# Patient Record
Sex: FEMALE | Race: OTHER | Employment: FULL TIME | URBAN - METROPOLITAN AREA
[De-identification: names, ages, dates, MRNs, and addresses within clinical notes are randomized per-mention and may not be internally consistent; named-entity substitution may affect disease eponyms.]

---

## 2018-09-25 LAB
ANTIBODY SCREEN, EXTERNAL: NEGATIVE
CHLAMYDIA, EXTERNAL: NEGATIVE
HBSAG, EXTERNAL: NEGATIVE
HIV, EXTERNAL: NORMAL
N. GONORRHEA, EXTERNAL: NEGATIVE
RPR, EXTERNAL: NEGATIVE
RUBELLA, EXTERNAL: NORMAL
TYPE, ABO & RH, EXTERNAL: NORMAL

## 2019-04-18 LAB — GRBS, EXTERNAL: NEGATIVE

## 2019-05-13 ENCOUNTER — ANESTHESIA EVENT (OUTPATIENT)
Dept: LABOR AND DELIVERY | Age: 29
End: 2019-05-13
Payer: COMMERCIAL

## 2019-05-13 ENCOUNTER — ANESTHESIA (OUTPATIENT)
Dept: LABOR AND DELIVERY | Age: 29
End: 2019-05-13
Payer: COMMERCIAL

## 2019-05-13 ENCOUNTER — HOSPITAL ENCOUNTER (INPATIENT)
Age: 29
LOS: 3 days | Discharge: HOME | End: 2019-05-16
Attending: OBSTETRICS & GYNECOLOGY | Admitting: OBSTETRICS & GYNECOLOGY
Payer: COMMERCIAL

## 2019-05-13 LAB
ALBUMIN SERPL-MCNC: 2.4 G/DL (ref 3.5–5)
ALBUMIN SERPL-MCNC: 2.4 G/DL (ref 3.5–5)
ALBUMIN/GLOB SERPL: 0.7 {RATIO} (ref 1.1–2.2)
ALBUMIN/GLOB SERPL: 0.7 {RATIO} (ref 1.1–2.2)
ALP SERPL-CCNC: 259 U/L (ref 45–117)
ALP SERPL-CCNC: 262 U/L (ref 45–117)
ALT SERPL-CCNC: 28 U/L (ref 12–78)
ALT SERPL-CCNC: 29 U/L (ref 12–78)
AMPHET UR QL SCN: NEGATIVE
ANION GAP SERPL CALC-SCNC: 5 MMOL/L (ref 5–15)
APPEARANCE UR: CLEAR
AST SERPL-CCNC: 22 U/L (ref 15–37)
AST SERPL-CCNC: 24 U/L (ref 15–37)
BACTERIA URNS QL MICRO: NEGATIVE /HPF
BARBITURATES UR QL SCN: NEGATIVE
BASOPHILS # BLD: 0 K/UL (ref 0–0.1)
BASOPHILS NFR BLD: 0 % (ref 0–1)
BENZODIAZ UR QL: NEGATIVE
BILIRUB DIRECT SERPL-MCNC: <0.1 MG/DL (ref 0–0.2)
BILIRUB SERPL-MCNC: 0.1 MG/DL (ref 0.2–1)
BILIRUB SERPL-MCNC: <0.1 MG/DL (ref 0.2–1)
BILIRUB UR QL: NEGATIVE
BUN SERPL-MCNC: 12 MG/DL (ref 6–20)
BUN/CREAT SERPL: 24 (ref 12–20)
CALCIUM SERPL-MCNC: 8.6 MG/DL (ref 8.5–10.1)
CANNABINOIDS UR QL SCN: NEGATIVE
CHLORIDE SERPL-SCNC: 111 MMOL/L (ref 97–108)
CO2 SERPL-SCNC: 21 MMOL/L (ref 21–32)
COCAINE UR QL SCN: NEGATIVE
COLOR UR: ABNORMAL
CREAT SERPL-MCNC: 0.5 MG/DL (ref 0.55–1.02)
CREAT UR-MCNC: 45.7 MG/DL
DIFFERENTIAL METHOD BLD: ABNORMAL
DRUG SCRN COMMENT,DRGCM: NORMAL
EOSINOPHIL # BLD: 0.1 K/UL (ref 0–0.4)
EOSINOPHIL NFR BLD: 1 % (ref 0–7)
EPITH CASTS URNS QL MICRO: ABNORMAL /LPF
ERYTHROCYTE [DISTWIDTH] IN BLOOD BY AUTOMATED COUNT: 14.9 % (ref 11.5–14.5)
GLOBULIN SER CALC-MCNC: 3.3 G/DL (ref 2–4)
GLOBULIN SER CALC-MCNC: 3.4 G/DL (ref 2–4)
GLUCOSE SERPL-MCNC: 68 MG/DL (ref 65–100)
GLUCOSE UR STRIP.AUTO-MCNC: NEGATIVE MG/DL
HCT VFR BLD AUTO: 36.2 % (ref 35–47)
HGB BLD-MCNC: 11.9 G/DL (ref 11.5–16)
HGB UR QL STRIP: NEGATIVE
HYALINE CASTS URNS QL MICRO: ABNORMAL /LPF (ref 0–5)
IMM GRANULOCYTES # BLD AUTO: 0 K/UL (ref 0–0.04)
IMM GRANULOCYTES NFR BLD AUTO: 1 % (ref 0–0.5)
KETONES UR QL STRIP.AUTO: NEGATIVE MG/DL
LDH SERPL L TO P-CCNC: 196 U/L (ref 81–246)
LEUKOCYTE ESTERASE UR QL STRIP.AUTO: ABNORMAL
LYMPHOCYTES # BLD: 2.6 K/UL (ref 0.8–3.5)
LYMPHOCYTES NFR BLD: 30 % (ref 12–49)
MAGNESIUM SERPL-MCNC: 2 MG/DL (ref 1.6–2.4)
MCH RBC QN AUTO: 29.1 PG (ref 26–34)
MCHC RBC AUTO-ENTMCNC: 32.9 G/DL (ref 30–36.5)
MCV RBC AUTO: 88.5 FL (ref 80–99)
METHADONE UR QL: NEGATIVE
MONOCYTES # BLD: 0.6 K/UL (ref 0–1)
MONOCYTES NFR BLD: 7 % (ref 5–13)
NEUTS SEG # BLD: 5.1 K/UL (ref 1.8–8)
NEUTS SEG NFR BLD: 61 % (ref 32–75)
NITRITE UR QL STRIP.AUTO: NEGATIVE
NRBC # BLD: 0 K/UL (ref 0–0.01)
NRBC BLD-RTO: 0 PER 100 WBC
OPIATES UR QL: NEGATIVE
PCP UR QL: NEGATIVE
PH UR STRIP: 7 [PH] (ref 5–8)
PLATELET # BLD AUTO: 174 K/UL (ref 150–400)
PMV BLD AUTO: 11.5 FL (ref 8.9–12.9)
POTASSIUM SERPL-SCNC: 4.2 MMOL/L (ref 3.5–5.1)
PROT SERPL-MCNC: 5.7 G/DL (ref 6.4–8.2)
PROT SERPL-MCNC: 5.8 G/DL (ref 6.4–8.2)
PROT UR STRIP-MCNC: 100 MG/DL
PROT UR-MCNC: 172 MG/DL (ref 0–11.9)
PROT/CREAT UR-RTO: 3.8
RBC # BLD AUTO: 4.09 M/UL (ref 3.8–5.2)
RBC #/AREA URNS HPF: ABNORMAL /HPF (ref 0–5)
SODIUM SERPL-SCNC: 137 MMOL/L (ref 136–145)
SP GR UR REFRACTOMETRY: 1.01 (ref 1–1.03)
URATE SERPL-MCNC: 4.1 MG/DL (ref 2.6–6)
UROBILINOGEN UR QL STRIP.AUTO: 0.2 EU/DL (ref 0.2–1)
WBC # BLD AUTO: 8.5 K/UL (ref 3.6–11)
WBC URNS QL MICRO: ABNORMAL /HPF (ref 0–4)

## 2019-05-13 PROCEDURE — 84156 ASSAY OF PROTEIN URINE: CPT

## 2019-05-13 PROCEDURE — 80307 DRUG TEST PRSMV CHEM ANLYZR: CPT

## 2019-05-13 PROCEDURE — 81001 URINALYSIS AUTO W/SCOPE: CPT

## 2019-05-13 PROCEDURE — 77010026065 HC OXYGEN MINIMUM MEDICAL AIR: Performed by: OBSTETRICS & GYNECOLOGY

## 2019-05-13 PROCEDURE — 80053 COMPREHEN METABOLIC PANEL: CPT

## 2019-05-13 PROCEDURE — 85025 COMPLETE CBC W/AUTO DIFF WBC: CPT

## 2019-05-13 PROCEDURE — 76060000078 HC EPIDURAL ANESTHESIA: Performed by: NURSE ANESTHETIST, CERTIFIED REGISTERED

## 2019-05-13 PROCEDURE — 36415 COLL VENOUS BLD VENIPUNCTURE: CPT

## 2019-05-13 PROCEDURE — 80076 HEPATIC FUNCTION PANEL: CPT

## 2019-05-13 PROCEDURE — 83735 ASSAY OF MAGNESIUM: CPT

## 2019-05-13 PROCEDURE — 75410000002 HC LABOR FEE PER 1 HR: Performed by: OBSTETRICS & GYNECOLOGY

## 2019-05-13 PROCEDURE — 59200 INSERT CERVICAL DILATOR: CPT | Performed by: OBSTETRICS & GYNECOLOGY

## 2019-05-13 PROCEDURE — 3E033VJ INTRODUCTION OF OTHER HORMONE INTO PERIPHERAL VEIN, PERCUTANEOUS APPROACH: ICD-10-PCS | Performed by: OBSTETRICS & GYNECOLOGY

## 2019-05-13 PROCEDURE — 84550 ASSAY OF BLOOD/URIC ACID: CPT

## 2019-05-13 PROCEDURE — 83615 LACTATE (LD) (LDH) ENZYME: CPT

## 2019-05-13 PROCEDURE — 65270000029 HC RM PRIVATE

## 2019-05-13 PROCEDURE — 74011250637 HC RX REV CODE- 250/637: Performed by: OBSTETRICS & GYNECOLOGY

## 2019-05-13 PROCEDURE — 75410000003 HC RECOV DEL/VAG/CSECN EA 0.5 HR: Performed by: OBSTETRICS & GYNECOLOGY

## 2019-05-13 PROCEDURE — 74011250636 HC RX REV CODE- 250/636: Performed by: OBSTETRICS & GYNECOLOGY

## 2019-05-13 PROCEDURE — 75410000000 HC DELIVERY VAGINAL/SINGLE: Performed by: OBSTETRICS & GYNECOLOGY

## 2019-05-13 RX ORDER — SODIUM CHLORIDE, SODIUM LACTATE, POTASSIUM CHLORIDE, CALCIUM CHLORIDE 600; 310; 30; 20 MG/100ML; MG/100ML; MG/100ML; MG/100ML
125 INJECTION, SOLUTION INTRAVENOUS CONTINUOUS
Status: DISCONTINUED | OUTPATIENT
Start: 2019-05-13 | End: 2019-05-14 | Stop reason: HOSPADM

## 2019-05-13 RX ORDER — ONDANSETRON 2 MG/ML
4 INJECTION INTRAMUSCULAR; INTRAVENOUS
Status: DISCONTINUED | OUTPATIENT
Start: 2019-05-13 | End: 2019-05-16 | Stop reason: HOSPADM

## 2019-05-13 RX ORDER — NALBUPHINE HYDROCHLORIDE 10 MG/ML
10 INJECTION, SOLUTION INTRAMUSCULAR; INTRAVENOUS; SUBCUTANEOUS
Status: DISCONTINUED | OUTPATIENT
Start: 2019-05-13 | End: 2019-05-15

## 2019-05-13 RX ORDER — SODIUM CHLORIDE 0.9 % (FLUSH) 0.9 %
5-40 SYRINGE (ML) INJECTION AS NEEDED
Status: DISCONTINUED | OUTPATIENT
Start: 2019-05-13 | End: 2019-05-15

## 2019-05-13 RX ORDER — FAMOTIDINE 20 MG/1
20 TABLET, FILM COATED ORAL 2 TIMES DAILY
Status: DISCONTINUED | OUTPATIENT
Start: 2019-05-13 | End: 2019-05-13

## 2019-05-13 RX ORDER — EPHEDRINE SULFATE/0.9% NACL/PF 50 MG/5 ML
10 SYRINGE (ML) INTRAVENOUS
Status: ACTIVE | OUTPATIENT
Start: 2019-05-13 | End: 2019-05-14

## 2019-05-13 RX ORDER — FENTANYL/BUPIVACAINE/NS/PF 2-1250MCG
10 PREFILLED PUMP RESERVOIR EPIDURAL CONTINUOUS
Status: DISCONTINUED | OUTPATIENT
Start: 2019-05-13 | End: 2019-05-14 | Stop reason: HOSPADM

## 2019-05-13 RX ORDER — OXYTOCIN/0.9 % SODIUM CHLORIDE 30/500 ML
0-25 PLASTIC BAG, INJECTION (ML) INTRAVENOUS
Status: DISCONTINUED | OUTPATIENT
Start: 2019-05-13 | End: 2019-05-15

## 2019-05-13 RX ORDER — FAMOTIDINE 20 MG/1
20 TABLET, FILM COATED ORAL 2 TIMES DAILY
Status: DISCONTINUED | OUTPATIENT
Start: 2019-05-13 | End: 2019-05-16 | Stop reason: HOSPADM

## 2019-05-13 RX ORDER — CETIRIZINE HCL 10 MG
10 TABLET ORAL
COMMUNITY

## 2019-05-13 RX ORDER — SODIUM CHLORIDE 0.9 % (FLUSH) 0.9 %
5-40 SYRINGE (ML) INJECTION EVERY 8 HOURS
Status: DISCONTINUED | OUTPATIENT
Start: 2019-05-13 | End: 2019-05-15

## 2019-05-13 RX ADMIN — OXYTOCIN-SODIUM CHLORIDE 0.9% IV SOLN 30 UNIT/500ML 2 MILLI-UNITS/MIN: 30-0.9/5 SOLUTION at 18:39

## 2019-05-13 RX ADMIN — FAMOTIDINE 20 MG: 20 TABLET ORAL at 20:42

## 2019-05-13 NOTE — ROUTINE PROCESS
1855: Bedside and Verbal shift change report given to UNA Kenny RN (oncoming nurse) by CLIFF Burk RN (offgoing nurse). Report included the following information SBAR, Kardex, Procedure Summary, Intake/Output, MAR, Accordion and Recent Results 1911: At pt bedside for assessment. This RN discussing pain management education regarding epidural, if she decides she wants it at a later time. Pt verbalized understanding and states she is comfortable and is without any complaints at this time. 2020: Pt states she is feeling gas and signs of indigestion. This RN calling Dr. Dyan Arvizu for medication orders. MD MASTERS Pepcid 20mg PO. No further orders at this time. 2235: Pt repositioning to the birthing ball at this time. 2330: Pt repositioned to standing at the side of bed, leaning over peanut ball. Pt coping and breathing through contractions at this time. 0863: Pt ambulating in hallway using the GE monitor at this time. 0125: Pt states she would like to try IV medication for pain management so she can rest. Pt without any other complaints. 0345: Pt stating the IV pain medication has worn off and contraction pain is intensifying. Pt still denies epidural.  Pt ambulating to the bathroom at this time. 8773: Pt ambulating to bathroom. Pt repositioned on birthing ball, in moderate pain but able to breath and vocalize through each contraction. 7723: This RN educating patient again on option of receiving an epidural. Pt verbalized understanding and denies request at this time. Pt repositioned to birthing ball and continuing to cope and breathe through contractions. 0193: This RN on phone with Dr. Dyan Arvizu giving her an update on pt status. Discussing that Da Silva bulb is still in place, pitocin is at 10, ctx every 2-3 minutes and pt denies epidural.  Md states that da silva bulb can is OK to stay in place, as pt is still within 24 hours of placement.   Per MD, reapply traction to catheter. No further orders at this time. 6408: At pt bedside helping pt get back into bed after ambulating to bathroom. This RN reapplied traction to Emerson bulb and pt is back in bed at this time. 0700: Bedside and Verbal shift change report given to TEAGAN Rivera RN (oncoming nurse) by Gab Benedict RN (offgoing nurse). Report included the following information SBAR, Kardex, Procedure Summary, Intake/Output, MAR, Accordion and Recent Results.

## 2019-05-13 NOTE — PROGRESS NOTES
1500 - Report from Santy Roger RN. Assumed care. Orders previously received from Dr. Paolo Alvares. Pt is a primip induction for mild Pre E.  
 
New orders from Dr. Rio Dong to include hourly BPs and patient may discontinue fetal monitoring if tracing reactive and minimal uterine activity. Pt may eat lunch and to start pitocin induction at 1800.  
1505 - Pt off monitors to eat per MD order after Category 1 tracing and occ mild ctx. Pt instructed to notify RN if contractions increased in intensity or frequency. 1543 - Rounding to determine patient needs. No voiced needs. +FM. 1700 - Rounding to determine patient needs. No voiced needs. Pt oriented to room and what to expect for plan of care. Time allowed for questions. 1800 - Pt back on monitor. 1810 - FHR 120s with mod variability. [place] to bedside to elicit accels. 1830 - Category 1 tracing without accels. MD evaluated fetal heart rate tracing and aware. To start pitocin.

## 2019-05-13 NOTE — PROGRESS NOTES
1248: Dr. Mo Yi at bedside for assessment. Pt reports slight headache but declines tylenol at this time. 1300: SVE per Dr. Mo Yi 1.5/50/posterior,  
 
: Clance Bevels da silva cervical catheter placed by Dr. Mo Yi and inflated with 60 cc of normal saline in the uterus and 30 cc of normal saline in the vagina; pt tolerated well. MD stated for patient to be on EFM for an hour and then may be taken off. MD ordering for BPs to be hourly. MD also ordering for pitocin to be started at 1800 tonight. 1318: Primary RN CLIFF Adhikari updated.

## 2019-05-13 NOTE — PROGRESS NOTES
1125 - Pt admitted to labor and delivery for induction for pre-e. MD aware of patient and orders previously given. Consents signed. EFM x 2 applied. Pt reports good FM; denies lof and bleeding. 1140 - SLIV placed at this time. Labs also drawn and urine collected per orders; sent to lab for processing. 1146 - VS taken. Admission assessment done. Pt resting comfortably in bed, denies needs.

## 2019-05-13 NOTE — H&P
History & Physical 
 
Name: Carly Simental MRN: [id number]  SSN: [id number] YOB: 1990  Age: 34 y.o. Sex: female Subjective:  
 
Estimated Date of Delivery: 19 OB History  Para Term  AB Living 1 SAB TAB Ectopic Molar Multiple Live Births # Outcome Date GA Lbr Benjamin/2nd Weight Sex Delivery Anes PTL Lv  
1 Current Ms. Sam Girard is a 33 yo   admitted with pregnancy at [address] [place] [address] 19 for induction of labor due to preeclampsia. Had an elevated blood pressure in the office on Friday 5/10/2019  For which preeclampsia lab work was collected. The patient followed up in the office today and her BP was 148/102. Her previous lab work was significant for a urine protein to creatinine ratio of 1.6. She complains of a dull headache that she rates 3/10 and brief episode of nausea that has resolved, and lower extremity edema x 3 wks. She declines Tylenol for the HA and denies vision changes, vomiting, ruq pain, epigastric pain, contractions, leakage of vaginal fluid, vaginal bleeding, and endorses fetal movement. Prenatal course complicated by preeclampsia, uterine myomas with largest measuring at 5 cm at 36 wks, simple ovarian cyst of the on the right ovary measuring 5 x 2.6 cm at 36 wks, left bartholin cyst, and UTI. Please see prenatal records for details. Prenatal Labs:  
O pos Antibody neg Rubella immune HIV neg Hep B neg GC neg Chlamydia neg VDRL neg GBS neg Past Medical History:  
Diagnosis Date  Gestational hypertension  Kidney disease   
 reflux as a baby- self corrected. Frequent UTIs as an adult.  Psychiatric problem Anxiety and Depression History reviewed. No pertinent surgical history. Social History Occupational History  Not on file Tobacco Use  Smoking status: Never Smoker  Smokeless tobacco: Never Used Substance and Sexual Activity  Alcohol use: Not Currently  Drug use: Never  Sexual activity: Yes  
  Partners: Male Birth control/protection: None  
h/o marijuana and ecstasy use 2 yrs ago, no IVDA, tried tobacco years ago, h/o social ETOH use outside of pregnancy Family Hx: mother- HTN, DM 
MGM- DM GynHx: denies STIs, denies HSV Allergies Allergen Reactions  Clindamycin Rash  Mold Sneezing  
  dust  
 
Prior to Admission medications Medication Sig Start Date End Date Taking? Authorizing Provider  
[id number] tracey-folic-dha (PRENATAL DHA+COMPLETE PRENATAL) [id number] mg-mcg-mg cmpk Take  by mouth. Yes Provider, Historical  
cetirizine (ZYRTEC) 10 mg tablet Take 10 mg by mouth. Yes Provider, Historical  
Vitamin D po daily Review of Systems: A comprehensive review of systems was negative except for that written in the History of Present Illness. Objective:  
 
Vitals: 
Vitals:  
 05/13/19 1210 05/13/19 1215 05/13/19 1218 05/13/19 1220 BP:   142/87 Pulse:   89 Resp:      
Temp:      
SpO2: 98% 97%  97% Physical Exam: 
Patient without distress. Heart: Regular rate and rhythm or S1S2 present Lung: clear to auscultation throughout lung fields, no wheezes, no rales, no rhonchi and normal respiratory effort Abdomen: soft, nontender, gravid, EFW 7 pounds 8 ounces Fundus: soft and non tender Perineum: blood absent, amniotic fluid absent Cervical Exam: 1-2/40/-3 vtx posterior SSE: cervix prepped with betadine, cook catheter inserted with 60 ml in the uterine balloon and 30 ml in the vaginal balloon Pelvimetry- sidewalls adequate, AP good, suprapubic arch good Lower Extremities:  - Edema 2+ pitting, DTR 2+, no clonus Membranes:  Intact Fetal Heart Rate: Baseline: 135 per minute Variability: moderate Accelerations: yes Decelerations: none Uterine contractions: infrequent and irregular Recent Results (from the past 12 hour(s)) PROTEIN/CREATININE RATIO, URINE  Collection Time: 05/13/19 11:41 AM  
 Result Value Ref Range Protein, urine random 172 (H) 0.0 - 11.9 mg/dL Creatinine, urine 45.70 mg/dL Protein/Creat. urine Ratio 3.8    
CBC WITH AUTOMATED DIFF Collection Time: 05/13/19 11:41 AM  
Result Value Ref Range WBC 8.5 3.6 - 11.0 K/uL  
 RBC 4.09 3.80 - 5.20 M/uL  
 HGB 11.9 11.5 - 16.0 g/dL HCT 36.2 35.0 - 47.0 % MCV 88.5 80.0 - 99.0 FL  
 MCH 29.1 26.0 - 34.0 PG  
 MCHC 32.9 30.0 - 36.5 g/dL  
 RDW 14.9 (H) 11.5 - 14.5 % PLATELET 822 722 - 789 K/uL MPV 11.5 8.9 - 12.9 FL  
 NRBC 0.0 0  WBC ABSOLUTE NRBC 0.00 0.00 - 0.01 K/uL NEUTROPHILS 61 32 - 75 % LYMPHOCYTES 30 12 - 49 % MONOCYTES 7 5 - 13 % EOSINOPHILS 1 0 - 7 % BASOPHILS 0 0 - 1 % IMMATURE GRANULOCYTES 1 (H) 0.0 - 0.5 % ABS. NEUTROPHILS 5.1 1.8 - 8.0 K/UL  
 ABS. LYMPHOCYTES 2.6 0.8 - 3.5 K/UL  
 ABS. MONOCYTES 0.6 0.0 - 1.0 K/UL  
 ABS. EOSINOPHILS 0.1 0.0 - 0.4 K/UL  
 ABS. BASOPHILS 0.0 0.0 - 0.1 K/UL  
 ABS. IMM. GRANS. 0.0 0.00 - 0.04 K/UL  
 DF AUTOMATED METABOLIC PANEL, COMPREHENSIVE Collection Time: 05/13/19 11:41 AM  
Result Value Ref Range Sodium 137 136 - 145 mmol/L Potassium 4.2 3.5 - 5.1 mmol/L Chloride 111 (H) 97 - 108 mmol/L  
 CO2 21 21 - 32 mmol/L Anion gap 5 5 - 15 mmol/L Glucose 68 65 - 100 mg/dL BUN 12 6 - 20 MG/DL Creatinine 0.50 (L) 0.55 - 1.02 MG/DL  
 BUN/Creatinine ratio 24 (H) 12 - 20 GFR est AA >60 >60 ml/min/1.73m2 GFR est non-AA >60 >60 ml/min/1.73m2 Calcium 8.6 8.5 - 10.1 MG/DL Bilirubin, total <0.1 (L) 0.2 - 1.0 MG/DL  
 ALT (SGPT) 29 12 - 78 U/L  
 AST (SGOT) 22 15 - 37 U/L Alk. phosphatase 262 (H) 45 - 117 U/L Protein, total 5.7 (L) 6.4 - 8.2 g/dL Albumin 2.4 (L) 3.5 - 5.0 g/dL Globulin 3.3 2.0 - 4.0 g/dL A-G Ratio 0.7 (L) 1.1 - 2.2 HEPATIC FUNCTION PANEL Collection Time: 05/13/19 11:41 AM  
Result Value Ref Range Protein, total 5.8 (L) 6.4 - 8.2 g/dL Albumin 2.4 (L) 3.5 - 5.0 g/dL Globulin 3.4 2.0 - 4.0 g/dL A-G Ratio 0.7 (L) 1.1 - 2.2 Bilirubin, total 0.1 (L) 0.2 - 1.0 MG/DL Bilirubin, direct <0.1 0.0 - 0.2 MG/DL Alk. phosphatase 259 (H) 45 - 117 U/L  
 AST (SGOT) 24 15 - 37 U/L  
 ALT (SGPT) 28 12 - 78 U/L  
LD Collection Time: 19 11:41 AM  
Result Value Ref Range  81 - 246 U/L  
MAGNESIUM Collection Time: 19 11:41 AM  
Result Value Ref Range Magnesium 2.0 1.6 - 2.4 mg/dL URIC ACID Collection Time: 19 11:41 AM  
Result Value Ref Range Uric acid 4.1 2.6 - 6.0 MG/DL URINALYSIS W/MICROSCOPIC Collection Time: 19 11:42 AM  
Result Value Ref Range Color YELLOW/STRAW Appearance CLEAR CLEAR Specific gravity 1.012 1.003 - 1.030    
 pH (UA) 7.0 5.0 - 8.0 Protein 100 (A) NEG mg/dL Glucose NEGATIVE  NEG mg/dL Ketone NEGATIVE  NEG mg/dL Bilirubin NEGATIVE  NEG Blood NEGATIVE  NEG Urobilinogen 0.2 0.2 - 1.0 EU/dL Nitrites NEGATIVE  NEG Leukocyte Esterase MODERATE (A) NEG    
 WBC 20-50 0 - 4 /hpf  
 RBC 0-5 0 - 5 /hpf Epithelial cells FEW FEW /lpf Bacteria NEGATIVE  NEG /hpf Hyaline cast 0-2 0 - 5 /lpf Impression/Plan:  
  
Ass:  at 39 4/7 wks induction of labor for preeclampsia, h/o marijuana and ecstasy use, GBS negative, cat 1 fetal tracing Plan: Reji Mole catheter placed without complications. Prior to placement indications and risks of catheter insertion were d/w the patient. Risks discussed included accidental amniotomy, bleeding from cervical vessels, and/or cervical laceration. The patient voiced understanding and agreed to catheter placement. Pitocin to be initiated at 1800 UDS

## 2019-05-14 PROCEDURE — 77030014125 HC TY EPDRL BBMI -B: Performed by: ANESTHESIOLOGY

## 2019-05-14 PROCEDURE — 65270000029 HC RM PRIVATE

## 2019-05-14 PROCEDURE — 74011250637 HC RX REV CODE- 250/637: Performed by: OBSTETRICS & GYNECOLOGY

## 2019-05-14 PROCEDURE — 10907ZC DRAINAGE OF AMNIOTIC FLUID, THERAPEUTIC FROM PRODUCTS OF CONCEPTION, VIA NATURAL OR ARTIFICIAL OPENING: ICD-10-PCS | Performed by: OBSTETRICS & GYNECOLOGY

## 2019-05-14 PROCEDURE — 77030021125

## 2019-05-14 PROCEDURE — 74011000250 HC RX REV CODE- 250

## 2019-05-14 PROCEDURE — 77030011943

## 2019-05-14 PROCEDURE — 74011250637 HC RX REV CODE- 250/637

## 2019-05-14 PROCEDURE — 3E0R3BZ INTRODUCTION OF ANESTHETIC AGENT INTO SPINAL CANAL, PERCUTANEOUS APPROACH: ICD-10-PCS | Performed by: ANESTHESIOLOGY

## 2019-05-14 PROCEDURE — 74011250636 HC RX REV CODE- 250/636: Performed by: OBSTETRICS & GYNECOLOGY

## 2019-05-14 PROCEDURE — 00HU33Z INSERTION OF INFUSION DEVICE INTO SPINAL CANAL, PERCUTANEOUS APPROACH: ICD-10-PCS | Performed by: ANESTHESIOLOGY

## 2019-05-14 PROCEDURE — 74011000250 HC RX REV CODE- 250: Performed by: NURSE ANESTHETIST, CERTIFIED REGISTERED

## 2019-05-14 PROCEDURE — 77030034850

## 2019-05-14 PROCEDURE — 74011250636 HC RX REV CODE- 250/636

## 2019-05-14 PROCEDURE — 74011000258 HC RX REV CODE- 258: Performed by: OBSTETRICS & GYNECOLOGY

## 2019-05-14 RX ORDER — DOCUSATE SODIUM 100 MG/1
100 CAPSULE, LIQUID FILLED ORAL DAILY
Status: DISCONTINUED | OUTPATIENT
Start: 2019-05-15 | End: 2019-05-16 | Stop reason: HOSPADM

## 2019-05-14 RX ORDER — OXYCODONE AND ACETAMINOPHEN 5; 325 MG/1; MG/1
1 TABLET ORAL
Status: DISCONTINUED | OUTPATIENT
Start: 2019-05-14 | End: 2019-05-16 | Stop reason: HOSPADM

## 2019-05-14 RX ORDER — METHYLERGONOVINE MALEATE 0.2 MG/ML
0.2 INJECTION INTRAVENOUS ONCE
Status: COMPLETED | OUTPATIENT
Start: 2019-05-14 | End: 2019-05-14

## 2019-05-14 RX ORDER — IBUPROFEN 800 MG/1
800 TABLET ORAL EVERY 8 HOURS
Status: DISCONTINUED | OUTPATIENT
Start: 2019-05-14 | End: 2019-05-16 | Stop reason: HOSPADM

## 2019-05-14 RX ORDER — LIDOCAINE HYDROCHLORIDE 10 MG/ML
INJECTION INFILTRATION; PERINEURAL
Status: DISPENSED
Start: 2019-05-14 | End: 2019-05-15

## 2019-05-14 RX ORDER — FENTANYL CITRATE 50 UG/ML
INJECTION, SOLUTION INTRAMUSCULAR; INTRAVENOUS
Status: COMPLETED
Start: 2019-05-14 | End: 2019-05-14

## 2019-05-14 RX ORDER — MINERAL OIL
OIL (ML) ORAL
Status: COMPLETED
Start: 2019-05-14 | End: 2019-05-14

## 2019-05-14 RX ORDER — BUPIVACAINE HYDROCHLORIDE 2.5 MG/ML
INJECTION, SOLUTION EPIDURAL; INFILTRATION; INTRACAUDAL AS NEEDED
Status: DISCONTINUED | OUTPATIENT
Start: 2019-05-14 | End: 2019-05-14 | Stop reason: HOSPADM

## 2019-05-14 RX ORDER — HYDROCORTISONE ACETATE PRAMOXINE HCL 2.5; 1 G/100G; G/100G
CREAM TOPICAL AS NEEDED
Status: DISCONTINUED | OUTPATIENT
Start: 2019-05-14 | End: 2019-05-16 | Stop reason: HOSPADM

## 2019-05-14 RX ORDER — OXYTOCIN/RINGER'S LACTATE 20/1000 ML
125-500 PLASTIC BAG, INJECTION (ML) INTRAVENOUS ONCE
Status: ACTIVE | OUTPATIENT
Start: 2019-05-14 | End: 2019-05-15

## 2019-05-14 RX ORDER — MINERAL OIL
OIL (ML) ORAL ONCE
Status: ACTIVE | OUTPATIENT
Start: 2019-05-14 | End: 2019-05-15

## 2019-05-14 RX ORDER — LIDOCAINE HYDROCHLORIDE AND EPINEPHRINE 15; 5 MG/ML; UG/ML
INJECTION, SOLUTION EPIDURAL
Status: SHIPPED | OUTPATIENT
Start: 2019-05-14 | End: 2019-05-14

## 2019-05-14 RX ORDER — ACETAMINOPHEN 325 MG/1
650 TABLET ORAL
Status: DISCONTINUED | OUTPATIENT
Start: 2019-05-14 | End: 2019-05-16 | Stop reason: HOSPADM

## 2019-05-14 RX ORDER — FENTANYL CITRATE 50 UG/ML
50 INJECTION, SOLUTION INTRAMUSCULAR; INTRAVENOUS ONCE
Status: COMPLETED | OUTPATIENT
Start: 2019-05-14 | End: 2019-05-14

## 2019-05-14 RX ORDER — ZOLPIDEM TARTRATE 5 MG/1
5 TABLET ORAL
Status: DISCONTINUED | OUTPATIENT
Start: 2019-05-14 | End: 2019-05-16 | Stop reason: HOSPADM

## 2019-05-14 RX ORDER — NALOXONE HYDROCHLORIDE 0.4 MG/ML
0.4 INJECTION, SOLUTION INTRAMUSCULAR; INTRAVENOUS; SUBCUTANEOUS AS NEEDED
Status: DISCONTINUED | OUTPATIENT
Start: 2019-05-14 | End: 2019-05-16 | Stop reason: HOSPADM

## 2019-05-14 RX ADMIN — MINERAL OIL 30 ML: 99.9 LIQUID ORAL; TOPICAL at 16:34

## 2019-05-14 RX ADMIN — SODIUM CHLORIDE, SODIUM LACTATE, POTASSIUM CHLORIDE, AND CALCIUM CHLORIDE 125 ML/HR: 600; 310; 30; 20 INJECTION, SOLUTION INTRAVENOUS at 01:22

## 2019-05-14 RX ADMIN — LIDOCAINE HYDROCHLORIDE AND EPINEPHRINE 3 ML: 15; 5 INJECTION, SOLUTION EPIDURAL at 09:44

## 2019-05-14 RX ADMIN — Medication 10 ML/HR: at 10:01

## 2019-05-14 RX ADMIN — BUPIVACAINE HYDROCHLORIDE 1 ML: 2.5 INJECTION, SOLUTION EPIDURAL; INFILTRATION; INTRACAUDAL at 09:41

## 2019-05-14 RX ADMIN — SODIUM CHLORIDE, SODIUM LACTATE, POTASSIUM CHLORIDE, AND CALCIUM CHLORIDE 999 ML/HR: 600; 310; 30; 20 INJECTION, SOLUTION INTRAVENOUS at 08:33

## 2019-05-14 RX ADMIN — SODIUM CHLORIDE, SODIUM LACTATE, POTASSIUM CHLORIDE, AND CALCIUM CHLORIDE 125 ML/HR: 600; 310; 30; 20 INJECTION, SOLUTION INTRAVENOUS at 09:50

## 2019-05-14 RX ADMIN — NALBUPHINE HYDROCHLORIDE 10 MG: 10 INJECTION, SOLUTION INTRAMUSCULAR; INTRAVENOUS; SUBCUTANEOUS at 02:08

## 2019-05-14 RX ADMIN — FENTANYL CITRATE 50 MCG: 50 INJECTION, SOLUTION INTRAMUSCULAR; INTRAVENOUS at 17:31

## 2019-05-14 RX ADMIN — FAMOTIDINE 20 MG: 20 TABLET ORAL at 19:20

## 2019-05-14 RX ADMIN — ONDANSETRON 4 MG: 2 INJECTION INTRAMUSCULAR; INTRAVENOUS at 05:39

## 2019-05-14 RX ADMIN — BUPIVACAINE HYDROCHLORIDE 4 ML: 2.5 INJECTION, SOLUTION EPIDURAL; INFILTRATION; INTRACAUDAL at 11:37

## 2019-05-14 RX ADMIN — IBUPROFEN 800 MG: 800 TABLET ORAL at 19:20

## 2019-05-14 RX ADMIN — METHYLERGONOVINE MALEATE 0.2 MG: 0.2 INJECTION, SOLUTION INTRAMUSCULAR; INTRAVENOUS at 16:22

## 2019-05-14 RX ADMIN — BUPIVACAINE HYDROCHLORIDE 3 ML: 2.5 INJECTION, SOLUTION EPIDURAL; INFILTRATION; INTRACAUDAL at 09:42

## 2019-05-14 RX ADMIN — PROMETHAZINE HYDROCHLORIDE 12.5 MG: 25 INJECTION INTRAMUSCULAR; INTRAVENOUS at 02:06

## 2019-05-14 RX ADMIN — BUPIVACAINE HYDROCHLORIDE 6 ML: 2.5 INJECTION, SOLUTION EPIDURAL; INFILTRATION; INTRACAUDAL at 11:35

## 2019-05-14 NOTE — PROGRESS NOTES
Patient feeling more comfortable now with second epidural placement.  
  
Visit Vitals /79 Pulse 75 Temp 98.1 °F (36.7 °C) Resp 16 Wt 86.2 kg (190 lb) SpO2 94%  
  
SVE: 6/70/-1, AROM  
  
NST: 120s/mod /pos accels/ no decels Contractions every 2-3 min  
  
35 yo G1 at 39.5 admitted for IOL for pre-eclampsia. Pitocin decreased during epidural placement, cont to titrate now BP predominately  normotensive to mild range, isolated severe around time of epidural replacement, no meds needed, continue to monitor Repeat cervical exam in 2- 4 hrs or sooner if indicated  
Cris Sun M.D

## 2019-05-14 NOTE — L&D DELIVERY NOTE
Delivery Summary    Patient: Tali Glover MRN: [id number]  SSN: [id number]    YOB: 1990  Age: 34 y.o. Sex: female        Patient progressed to C/C/+1. Pushed for approximately  45  minutes before delivery of fetal head in OA position. Gentle downward traction of fetal head with easy delivery of shoulders and remainder of body. No Nuchal cord. Infant placed on mothers abdomen. Placenta delivered intact in 10 minutes. Perineum inspected and noted a hemostatic left sidewall abrasion. No sutures were placed given hemostatic. Brisk bleeding was noted secondary to uterine atony. Bleeding resolved with the fundal massage and methergine x 1 dose. EBL: 500ml    Information for the patient's :  Justyn Drake [[id number]]       Labor Events:    Labor: No    Steroids: None   Cervical Ripening Date/Time: 2019     Cervical Ripening Type: Emerson/EASI   Antibiotics During Labor: No   Rupture Identifier: Sac 1    Rupture Date/Time: 2019     Rupture Type: AROM   Amniotic Fluid Volume:      Amniotic Fluid Description:      Amniotic Fluid Odor:      Induction: Emerson Bulb (balloon); Oxytocin;AROM       Induction Date/Time:        Indications for Induction:      Augmentation: None   Augmentation Date/Time:      Indications for Augmentation:     Labor complications:          Additional complications:        Delivery Events:  Indications For Episiotomy:     Episiotomy:     Perineal Laceration(s):     Repaired:     Periurethral Laceration Location:      Repaired:     Labial Laceration Location:     Repaired:     Sulcal Laceration Location:     Repaired:     Vaginal Laceration Location:     Repaired:     Cervical Laceration Location:     Repaired:     Repair Suture:     Number of Repair Packets:     Estimated Blood Loss (ml):  ml     Delivery Date: 2019    Delivery Time: 4:07 PM  Delivery Type:    Sex:  Female    Gestational Age: 38w11d   Delivery Clinician:  Vladimir Lorenzo Aaron  Living Status: Living   Delivery Location:              [facility]  One minute Five minutes Ten minutes   Skin color:            Heart rate:            Grimace:            Muscle tone:            Breathing: Totals:                Presentation: Vertex    Position:        Resuscitation Method:        Meconium Stained:        Cord Information: 3 Vessels  Complications:    Cord around:    Delayed cord clamping? Cord clamped date/time:   Disposition of Cord Blood: Lab    Blood Gases Sent?:      Placenta:  Date/Time: 2019  4:13 PM  Removal: Spontaneous      Appearance: Normal     [place] Measurements:  Birth Weight:        Birth Length:        Head Circumference:        Chest Circumference:       Abdominal Girth: Other Providers:   Naty SHAH MELISSA;KARLY FITZGERALD;AIXA MANUEL;CORINE KAUFMAN, Obstetrician;Primary Nurse;Primary [place] Nurse;Staff Nurse;Nursery Nurse;Scrub Tech           Group B Strep:   Lab Results   Component Value Date/Time    GrBStrep, External Negative 2019     Information for the patient's :  Shukri Do Female Ninive [[id number]]   No results found for: ABORH, PCTABR, PCTDIG, BILI, ABORHEXT, ABORH    No results for input(s): PCO2CB, PO2CB, HCO3I, SO2I, IBD, PTEMPI, SPECTI, PHICB, ISITE, IDEV, IALLEN in the last 72 hours.

## 2019-05-14 NOTE — ANESTHESIA PROCEDURE NOTES
CSE Block Start time: 5/14/2019 9:32 AM 
End time: 5/14/2019 9:48 AM 
Performed by: Lolly Ndiaye CRNA Authorized by: Lolly Ndiaye CRNA Pre-Procedure Indications: at surgeon's request, procedure for pain and primary anesthetic   
preanesthetic checklist: patient identified, risks and benefits discussed, anesthesia consent, site marked, patient being monitored and timeout performed Timeout Time: 09:35 Procedure:  
Patient Position:  Seated Prep Region:  Lumbar Prep: Betadine and patient draped Location:  L2-3 Epidural Needle:  
Needle Type:  Tuohy Needle Gauge:  17 G Injection Technique:  Loss of resistance using air Attempts:  1 Spinal Needle:  
Needle Type:  Pencil-tip Needle Gauge:  27 G Catheter:  
Catheter Type:  Flex-tip Catheter Size:  18 G Catheter at Skin Depth (cm):  11 Depth in Epidural Space (cm):  4 Events: no blood with aspiration, no cerebrospinal fluid with aspiration, no paresthesia, negative aspiration test and CSF confirmed Test Dose:  Negative Assessment:  
Catheter Secured:  Tegaderm and tape Insertion:  Uncomplicated Patient tolerance:  Patient tolerated the procedure well with no immediate complications

## 2019-05-14 NOTE — ANESTHESIA PREPROCEDURE EVALUATION
Relevant Problems No relevant active problems Anesthetic History No history of anesthetic complications Review of Systems / Medical History Patient summary reviewed, nursing notes reviewed and pertinent labs reviewed Pulmonary Within defined limits Neuro/Psych Within defined limits Cardiovascular Hypertension GI/Hepatic/Renal 
  
GERD: well controlled Endo/Other Within defined limits Other Findings Physical Exam 
 
Airway Mallampati: II 
TM Distance: 4 - 6 cm Neck ROM: normal range of motion Mouth opening: Normal 
 
 Cardiovascular Rhythm: regular Rate: normal 
 
 
 
 Dental 
No notable dental hx Pulmonary Breath sounds clear to auscultation Abdominal 
Abdominal exam normal 
 
 
 Other Findings Anesthetic Plan ASA: 2 Anesthesia type: CSE Post-op pain plan if not by surgeon: indwelling epidural catheter Anesthetic plan and risks discussed with: Patient

## 2019-05-14 NOTE — PROGRESS NOTES
07:00- Bedside and Verbal shift change report given to TEAGAN Zamarripa (oncoming nurse) by Gab Benedict RN (offgoing nurse). Report included the following information SBAR, Procedure Summary, Intake/Output, MAR, Accordion and Recent Results. 07:20- Alka Lazar catheter removed with light tension. Pt resting comfortably in bed. Pitocin at rate of 10cc/hr 08:28- Nitrous oxide administered. Pt educated on use/ side effects/ safety precautions. Consent signed. 08:33- Pt requesting epidural. Bolus started. 09:06- Pitocin stopped. RN unable to trace ctx adequately d/t maternal movement and ambulation to BR.  
 
09:35- Sitting up for epidural. Timeout performed. 09:50- Pt returned to supine position tilted to left after CSE/epidural placement. 10:15- Pt turned to right side and peanut ball positioned between legs. 10:20- Pt asleep in bed.  at bedside asleep on couch. 10:52- RN attempted to drain bladder with I&O catheter. Pt with burning and discomfort with attempt to place. RN stopped procedure. Pt states feels pressure in buttocks and pain on right side. RN has pt turn to right side and use PCA button for epidural. Will reassess in 10 min. 11:05- RN notified Tammy Nephew pt with right sided/ center pain with ctx despite epidural. Lan Squires CRNA states will come to bedside to assess in approx 15-20 min. 11:20- MD Pierre notified pt having pain and epidural needs to be assessed. MD with another pt and unavailable at this time. States will find someone to assess epidural but may need to wait for JINA Marin. 
 
11:35- JINA Marin at bedside to administer bolus through epidural. 10cc and .25% dosage per JINA Marin given. 11:50- MD Pierre at bedside to replace epidural catheter. 12:16- Pt appears to be sleeping through ctx after epidural replacement. 12:50- Emerson catheter in place draining clear yellow urine.  Pt resting on left side with peanut ball positioned between legs. Sleeping through ctx 14:10- Pt resting comfortably in bed. C/o discomfort around catheter, but ctx tolerable. Pressure intensifies with ctx. Pt educated to notify RN when pressure becomes constant/ consistent despite ctx. Pt verbalizes understanding. 16:07-  of viable infant female. 16:30- QBL at delivery 350cc. Pt given methergine IM 0.2 mg for postpartum bleeding at 16:22 in addition to bolus of 30/500 pitocin. MD at bedside providing fundal massage. Uterus remains firm bleeding decreasing with massage and after administration of methergine. RN to continue to assess bleeding. 17:16- Pt with continued heavy vaginal bleeding with fundal assessment. RN notified Tavo Malone MD. MD to bedside to assess. I&O cath performed to ensure bladder empty and not cause of bleeding. 100cc urine obtained. Fundus assessed after catheterization. Firm but still heavy bleeding noted. MD educates pt on need for manual sweep of uterus. Pt agreeable to plan. MD gives verbal order for 50mcg fentanyl IV. 
 
17:32- Fentanyl given 50mcg IV. Prior to manual sweep. 
 
17:34- Manual sweep performed by MD Abhishek. Small piece of membrane and moderately sized blood clot removed with uterine sweep. RN to continue fundal assessments. 19:00- Total QBL 751cc Bedside and Verbal shift change report given to Basilio Brooks (oncoming nurse) by [place]. Karen Live (offgoing nurse). Report included the following information SBAR, Procedure Summary, Intake/Output, MAR, Accordion and Recent Results.

## 2019-05-14 NOTE — PROGRESS NOTES
05/14/19 2:46 PM 
CM met with patient to complete initial assessment and to begin discharge planning. Demographics were reviewed and confirmed and this is the couple's first baby. 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 FOB works and will have at least a week off. Supports include family and friends to assist during the postpartum period. Patient has car seat, crib, clothing, and other necessary supplies. Patient plans to breastfeed and has a pump to use at home. Pediatrician is selected; however, she cannot recall the name, but PORSHA knows. She understands that they will need to schedule the baby's follow up appt with their selected pediatrician. Denied need for [facility] and Medicaid services. Care Management Interventions PCP Verified by CM: Yes Mode of Transport at Discharge: Self Transition of Care Consult (CM Consult): Discharge Planning Current Support Network: Lives with Spouse, Family Lives [place] Confirm Follow Up Transport: Family Plan discussed with Pt/Family/Caregiver: Yes Freedom of Choice Offered: Yes Discharge Location Discharge Placement: Home with family assistance [facility] [place]

## 2019-05-14 NOTE — PROGRESS NOTES
Patient breathing through contractions on labor ball. Visit Vitals /79 Pulse 75 Temp 98.1 °F (36.7 °C) Resp 16 Wt 86.2 kg (190 lb) SpO2 94% SVE: 4/70/-2, AROM  
 
NST: 120s/mod /pos accels/ no decels Contractions every 2-3 min 35 yo G1 at 39.5 admitted for IOL for pre-eclampsia. Emerson ballon removed at 720 this am, continue to titrate pitocin, current rate of 12U, AROM this am  
BP normotensive to mild range, no meds needed, continue to monitor Repeat cervical exam in 4 hrs or sooner if indicated Belem Finley M.D.

## 2019-05-14 NOTE — PROGRESS NOTES
The patient is starting to feel contractions that are becoming increasingly stronger. Visit Vitals /84 Pulse 81 Temp 98.6 °F (37 °C) Resp 15 Wt 86.2 kg (190 lb) SpO2 99% FHR: 130 moderate variability, accelerations present, no decelerations, cat 1 fetal tracing [place]: irregular contractions, pitocin at 6 mu Ass/Plan:  at 39 4/7 wks IOL for preeclampsia, cat 1 fetal tracing Catheter placed on tension Continue to titrate pitocin

## 2019-05-14 NOTE — PROGRESS NOTES
Contacted by nursing staff as patient had heavier bleeding with most recent fundal massage. Straight cath done with approx 50ml of urine Uterus palpates firm but increased bleeding with massage Discussed with patient evaluation for retained products with manual sweep Fentanyl 50mcg given, 150ml of clot removed, small pieces of membranes noted Will continue to monitor bleeding at this time Total EBL 700ml at this time  CBC in am  
 
Qian Lex BUI.

## 2019-05-15 LAB
BASOPHILS # BLD: 0 K/UL (ref 0–0.1)
BASOPHILS NFR BLD: 0 % (ref 0–1)
DIFFERENTIAL METHOD BLD: ABNORMAL
EOSINOPHIL # BLD: 0 K/UL (ref 0–0.4)
EOSINOPHIL NFR BLD: 0 % (ref 0–7)
ERYTHROCYTE [DISTWIDTH] IN BLOOD BY AUTOMATED COUNT: 14.9 % (ref 11.5–14.5)
HCT VFR BLD AUTO: 28.2 % (ref 35–47)
HGB BLD-MCNC: 9.2 G/DL (ref 11.5–16)
IMM GRANULOCYTES # BLD AUTO: 0.1 K/UL (ref 0–0.04)
IMM GRANULOCYTES NFR BLD AUTO: 1 % (ref 0–0.5)
LYMPHOCYTES # BLD: 4.8 K/UL (ref 0.8–3.5)
LYMPHOCYTES NFR BLD: 24 % (ref 12–49)
MCH RBC QN AUTO: 29 PG (ref 26–34)
MCHC RBC AUTO-ENTMCNC: 32.6 G/DL (ref 30–36.5)
MCV RBC AUTO: 89 FL (ref 80–99)
MONOCYTES # BLD: 1.5 K/UL (ref 0–1)
MONOCYTES NFR BLD: 7 % (ref 5–13)
NEUTS SEG # BLD: 13.5 K/UL (ref 1.8–8)
NEUTS SEG NFR BLD: 68 % (ref 32–75)
NRBC # BLD: 0 K/UL (ref 0–0.01)
NRBC BLD-RTO: 0 PER 100 WBC
PLATELET # BLD AUTO: 176 K/UL (ref 150–400)
PMV BLD AUTO: 11.2 FL (ref 8.9–12.9)
RBC # BLD AUTO: 3.17 M/UL (ref 3.8–5.2)
WBC # BLD AUTO: 19.9 K/UL (ref 3.6–11)

## 2019-05-15 PROCEDURE — 74011250637 HC RX REV CODE- 250/637: Performed by: OBSTETRICS & GYNECOLOGY

## 2019-05-15 PROCEDURE — 85025 COMPLETE CBC W/AUTO DIFF WBC: CPT

## 2019-05-15 PROCEDURE — 65270000029 HC RM PRIVATE

## 2019-05-15 PROCEDURE — 77030018846 HC SOL IRR STRL H20 ICUM -A

## 2019-05-15 PROCEDURE — 36415 COLL VENOUS BLD VENIPUNCTURE: CPT

## 2019-05-15 RX ADMIN — IBUPROFEN 800 MG: 800 TABLET ORAL at 20:13

## 2019-05-15 RX ADMIN — IBUPROFEN 800 MG: 800 TABLET ORAL at 02:44

## 2019-05-15 RX ADMIN — IBUPROFEN 800 MG: 800 TABLET ORAL at 11:16

## 2019-05-15 RX ADMIN — MUPIROCIN: 20 OINTMENT TOPICAL at 22:51

## 2019-05-15 RX ADMIN — DOCUSATE SODIUM 100 MG: 100 CAPSULE, LIQUID FILLED ORAL at 09:29

## 2019-05-15 NOTE — PROGRESS NOTES
Post-Partum Day Number 1 Progress Note Olivia Assessment:  
Doing well, post partum day 1 Pre eclampsia without severe features, BPs normal to mild range, asymptomatic, will continue to monitor BPs today and will start labetalol if BPs are persistently > 140/90 PPH  mL s/p manual sweep, lochia normal now, Hgb c/w mild anemia, pt asymptomatic Female Plan: 1. Continue routine postpartum and perineal care as well as maternal education. 2. N/A female 3. Watch BPs, labetalol if needed 4. Iron on discharge Information for the patient's :  Med Malik [[id number]] Vaginal, Spontaneous Patient doing well without significant complaint. Voiding without difficulty, normal lochia. Vitals: 
Visit Vitals /90 (BP 1 Location: Right arm, BP Patient Position: Post activity) Pulse 87 Temp 98.4 °F (36.9 °C) Resp 16 Wt 86.2 kg (190 lb) SpO2 98% Breastfeeding? Unknown Temp (24hrs), Av.5 °F (36.9 °C), Min:97.9 °F (36.6 °C), Max:99.1 °F (37.3 °C) Exam:   Patient without distress. Abdomen soft, fundus firm, nontender Perineum with normal lochia noted. Lower extremities are negative for swelling, cords or tenderness. Labs:  
 
Lab Results Component Value Date/Time WBC 19.9 (H) 05/15/2019 02:24 AM  
 WBC 8.5 2019 11:41 AM  
 HGB 9.2 (L) 05/15/2019 02:24 AM  
 HGB 11.9 2019 11:41 AM  
 HCT 28.2 (L) 05/15/2019 02:24 AM  
 HCT 36.2 2019 11:41 AM  
 PLATELET 823  02:24 AM  
 PLATELET 213  11:41 AM  
 
 
Recent Results (from the past 24 hour(s)) CBC WITH AUTOMATED DIFF Collection Time: 05/15/19  2:24 AM  
Result Value Ref Range WBC 19.9 (H) 3.6 - 11.0 K/uL  
 RBC 3.17 (L) 3.80 - 5.20 M/uL HGB 9.2 (L) 11.5 - 16.0 g/dL HCT 28.2 (L) 35.0 - 47.0 % MCV 89.0 80.0 - 99.0 FL  
 MCH 29.0 26.0 - 34.0 PG  
 MCHC 32.6 30.0 - 36.5 g/dL RDW 14.9 (H) 11.5 - 14.5 % PLATELET 654 095 - 015 K/uL MPV 11.2 8.9 - 12.9 FL  
 NRBC 0.0 0  WBC ABSOLUTE NRBC 0.00 0.00 - 0.01 K/uL NEUTROPHILS 68 32 - 75 % LYMPHOCYTES 24 12 - 49 % MONOCYTES 7 5 - 13 % EOSINOPHILS 0 0 - 7 % BASOPHILS 0 0 - 1 % IMMATURE GRANULOCYTES 1 (H) 0.0 - 0.5 % ABS. NEUTROPHILS 13.5 (H) 1.8 - 8.0 K/UL  
 ABS. LYMPHOCYTES 4.8 (H) 0.8 - 3.5 K/UL  
 ABS. MONOCYTES 1.5 (H) 0.0 - 1.0 K/UL  
 ABS. EOSINOPHILS 0.0 0.0 - 0.4 K/UL  
 ABS. BASOPHILS 0.0 0.0 - 0.1 K/UL  
 ABS. IMM. GRANS. 0.1 (H) 0.00 - 0.04 K/UL  
 DF AUTOMATED

## 2019-05-15 NOTE — LACTATION NOTE
This note was copied from a baby's chart. Mother holding baby - baby was fussing. LC assessed mother's breasts - nipples shirley but are short. Baby has a short frenulum and mother is having some tenderness with feedings (nipples are intact and mother has been using a nipple shield.) Parents would like and ENT consult - neonatologist aware and ENT consult ordered. Baby put to breast - LC tried latch assist to shirley nipple but it did not help Nipple shield applied to left nipple - baby latched on and breast fed for 15 minutes - colostrum seen in shield. Pt will successfully establish breastfeeding by feeding in response to infant's early feeding cues and/or to offer breast every 2-3 hours. Ways to obtain a deep latch and seek comfortable positioning shared, aware to keep log of feedings/output. Encouraged mom to attempt feeding with baby led feeding cues. Just as sucking on fingers, rooting, mouthing. Looking for 8-12 feedings in 24 hours. Don't limit baby at breast, allow baby to come of breast on it's own. Baby may want to feed  often and may increase number of feedings on second day of life. Skin to skin encouraged. If baby doesn't nurse,  Mom should  hand express  10-20 drops of colostrum and drip into baby's mouth, or give to baby by finger feeding, cup feeding, or spoon feeding at least every 2-3 hours. Discussed with mother her plan for feeding. Reviewed the benefits of exclusive breast milk feeding during the hospital stay. Informed her of the risks of using formula to supplement in the first few days of life as well as the benefits of successful breast milk feeding; referred her to the Breastfeeding booklet about this information. She acknowledges understanding of information reviewed and states that it is her plan to breastfeed her infant. Will support her choice and offer additional information as needed.   
 
Hand Expression Education:  Mom taught how to manually hand express her colostrum. Emphasized the importance of providing infant with valuable colostrum as infant rests skin to skin at breast.  Aware to avoid extended periods of non-feeding. Aware to offer 10-20+ drops of colostrum every 2-3 hours until infant is latching and nursing effectively. Taught the rationale behind this low tech but highly effective evidence based practice. Shield use recommended due to short nipples/baby having latch difficulty; use of shield affords deeper more comfortable latching with sustained rhythmic suckling and intermittent swallowing noted. Proper care, application and use of shield discussed; anticipatory guidance shared. Pt will successfully establish breastfeeding by feeding in response to early feeding cues  
or wake every 3h, will obtain deep latch, and will keep log of feedings/output. Taught to BF at hunger cues and or q 2-3 hrs and to offer 10-20 drops of hand expressed colostrum at any non-feeds. Breast Assessment Left Breast: Medium Left Nipple: Everted, Intact, Short Right Breast: Medium Right Nipple: Everted, Intact, Short Breast- Feeding Assessment Attends Breast-Feeding Classes: Yes Breast-Feeding Experience: No 
Breast Trauma/Surgery: No 
Type/Quality: Fair(Baby has been sleepy at times and mother has been expressing drops at sleepy times ) Lactation Consultant Visits Breast-Feedings: Good (Baby was fussy and had difficulty latching on (nipples are short) - latch assist did not help. Nipple shield applied-baby latched on and nursed for 15 min. Colostrum seen in shield. Mom had some discomfort-baby has short frenulum-parents want ENT consult) Mother/Infant Observation Mother Observation: Alignment, Holds breast, Breast comfortable, Lets baby end feeding, Close hold, Nipple round on release, Recognizes feeding cues Infant Observation: Audible swallows, Lips flanged, upper, Opens mouth, Feeding cues, Relaxed after feeding, Frenulum checked, Rhythmic suck, Latches nipple and aereolae, Lips flanged, lower(Baby has short frenulum-neonatologist notified/ENT consult ordered) LATCH Documentation Latch: Grasps breast, tongue down, lips flanged, rhythmic sucking Audible Swallowing: A few with stimulation Type of Nipple: Everted (after stimulation)(short nipples-shield used-colostrum in shield) Comfort (Breast/Nipple): Filling, red/small blisters/bruises, mild/mod discomfort(Nipple tenderness noted during feeding) Hold (Positioning): No assist from staff, mother able to position/hold infant LATCH Score: 8

## 2019-05-15 NOTE — PROGRESS NOTES
- Pericare done, ice pad replaced, Pt assisted to wheelchair, tolerated well -SBAR OUT Report: Mother Verbal report given to [facility] (full name & credentials) on this patient, who is now being transferred to MIU (unit) for routine progression of care. Report consisted of patient's Situation, Background, Assessment and Recommendations (SBAR).  ID bands were compared with the identification form, and verified with the patient and receiving nurse.

## 2019-05-15 NOTE — ROUTINE PROCESS
Bedside and Verbal shift change report given to Fco Houston RN (oncoming nurse) by Bernadette Simental RN  (offgoing nurse). Report included the following information SBAR, Kardex, Intake/Output, MAR and Recent Results.

## 2019-05-15 NOTE — PROGRESS NOTES
TRANSFER - IN REPORT: 
 
Verbal report received from Jaxon Schaefer RN (name) on [facility]  being received from L& D(unit) for routine progression of care Report consisted of patients Situation, Background, Assessment and  
Recommendations(SBAR). Information from the following report(s) SBAR, Kardex, Intake/Output, MAR and Recent Results was reviewed with the receiving nurse. Opportunity for questions and clarification was provided. Assessment completed upon patients arrival to unit and care assumed.

## 2019-05-16 VITALS
OXYGEN SATURATION: 98 % | HEART RATE: 79 BPM | RESPIRATION RATE: 16 BRPM | WEIGHT: 190 LBS | TEMPERATURE: 98.5 F | DIASTOLIC BLOOD PRESSURE: 87 MMHG | SYSTOLIC BLOOD PRESSURE: 127 MMHG

## 2019-05-16 PROCEDURE — 74011250637 HC RX REV CODE- 250/637: Performed by: OBSTETRICS & GYNECOLOGY

## 2019-05-16 PROCEDURE — 77030018846 HC SOL IRR STRL H20 ICUM -A

## 2019-05-16 RX ORDER — LANOLIN ALCOHOL/MO/W.PET/CERES
325 CREAM (GRAM) TOPICAL
Qty: 30 TAB | Refills: 1 | Status: SHIPPED | OUTPATIENT
Start: 2019-05-16

## 2019-05-16 RX ORDER — IBUPROFEN 800 MG/1
800 TABLET ORAL
Qty: 40 TAB | Refills: 1 | Status: SHIPPED | OUTPATIENT
Start: 2019-05-16

## 2019-05-16 RX ORDER — OXYCODONE AND ACETAMINOPHEN 5; 325 MG/1; MG/1
1 TABLET ORAL
Qty: 10 TAB | Refills: 0 | Status: SHIPPED | OUTPATIENT
Start: 2019-05-16 | End: 2019-05-19

## 2019-05-16 RX ADMIN — IBUPROFEN 800 MG: 800 TABLET ORAL at 05:30

## 2019-05-16 RX ADMIN — FAMOTIDINE 20 MG: 20 TABLET ORAL at 09:49

## 2019-05-16 RX ADMIN — IBUPROFEN 800 MG: 800 TABLET ORAL at 13:52

## 2019-05-16 RX ADMIN — DOCUSATE SODIUM 100 MG: 100 CAPSULE, LIQUID FILLED ORAL at 09:49

## 2019-05-16 NOTE — PROGRESS NOTES
Post-Partum Day Number 2 Progress Note Olivia Assessment: Doing well, post partum day 2 Pre-eclampsia without severe features: BPs normal to mild range after delivery. No si/sx of progression to severe disease.  
- plan for 1 week BP check in the office Uterine atony immediately following delivery: EBL 700cc. lochai now normal, VS WNL, Hgb after delivery 9.2 c/w mod anemia 
- discharge home with iron Plan: 1. Discharge home today 2. Follow up in office in 1 and 6 weeks with Trey Ferrell MD or partner 3. Post partum activity advised, diet as tolerated 4. Discharge Medications: ibuprofen, percocet and medications prior to admission Information for the patient's :  Ad Connelly [[id number]] Vaginal, Spontaneous S: Patient doing well without significant complaint. Voiding without difficulty, normal lochia. Vitals: 
Visit Vitals /87 (BP 1 Location: Left arm) Pulse 79 Temp 98.5 °F (36.9 °C) Resp 16 Wt 86.2 kg (190 lb) SpO2 98% Breastfeeding? Unknown Temp (24hrs), Av.5 °F (36.9 °C), Min:98.4 °F (36.9 °C), Max:98.5 °F (36.9 °C) Exam:    
    Patient without distress. Abdomen soft, fundus firm, nontender Lower extremities are negative for swelling, cords or tenderness. Labs:  
 
Lab Results Component Value Date/Time WBC 19.9 (H) 05/15/2019 02:24 AM  
 WBC 8.5 2019 11:41 AM  
 HGB 9.2 (L) 05/15/2019 02:24 AM  
 HGB 11.9 2019 11:41 AM  
 HCT 28.2 (L) 05/15/2019 02:24 AM  
 HCT 36.2 2019 11:41 AM  
 PLATELET 291  02:24 AM  
 PLATELET 303  11:41 AM  
 
 
No results found for this or any previous visit (from the past 24 hour(s)).

## 2019-05-16 NOTE — DISCHARGE INSTRUCTIONS
POSTPARTUM DISCHARGE INSTRUCTIONS       Name:  Tali Glover  YOB: 1990  Admission Diagnosis:  Labor and delivery, indication for care [O75.9]     Discharge Diagnosis:    Problem List as of 5/16/2019 Never Reviewed          Codes Class Noted - Resolved    Labor and delivery, indication for care ICD-10-CM: O75.9  ICD-9-CM: 659.90  5/13/2019 - Present            Attending Physician:  Liana Castillo MD    Delivery Type:  Vaginal Childbirth: What To Expect At Home    Your Recovery: Your body will slowly heal in the next few weeks. It is easy to get too tired and overwhelmed during the first weeks after your baby is born. Changes in your hormones can shift your mood without warning. You may find it hard to meet the extra demands on your energy and time. Take it easy on yourself. Follow-up care is a key part of your treatment and safety. Be sure to make and go to all appointments, and call your doctor if you are having problems. It's also a good idea to know your test results and keep a list of the medicines you take. How can you care for yourself at home? Vaginal bleeding and cramps  · After delivery, you will have a bloody discharge from the vagina. This will turn pink within a week and then white or yellow after about 10 days. It may last for 2 to 4 weeks or longer, until the uterus has healed. Use pads instead of tampons until you stop bleeding. · Do not worry if you pass some blood clots, as long as they are smaller than a golf ball. If you have a tear or stitches in your vaginal area, change the pad at least every 4 hours to prevent soreness and infection. · You may have cramps for the first few days after childbirth. These are normal and occur as the uterus shrinks to normal size. Take an over-the-counter pain medicine, such as acetaminophen (Tylenol), ibuprofen (Advil, Motrin), or naproxen (Aleve), for cramps. Read and follow all instructions on the label.  Do not take aspirin, because it can cause more bleeding. Do not take acetaminophen (Tylenol) and other acetaminophen containing medications (i.e. Percocet) at the same time. Breast fullness  · Your breasts may overfill (engorge) in the first few days after delivery. To help milk flow and to relieve pain, warm your breasts in the shower or by using warm, moist towels before nursing. · If you are not nursing, do not put warmth on your breasts or touch your breasts. Wear a tight bra or sports bra and use ice until the fullness goes away. This usually takes 2 to 3 days. · Put ice or a cold pack on your breast after nursing to reduce swelling and pain. Put a thin cloth between the ice and your skin. Activity  · Eat a balanced diet. Do not try to lose weight by cutting calories. Keep taking your prenatal vitamins, or take a multivitamin. · Get as much rest as you can. Try to take naps when your baby sleeps during the day. · Get some exercise every day. But do not do any heavy exercise until your doctor says it is okay. · Wait until you are healed (about 4 to 6 weeks) before you have sexual intercourse. Your doctor will tell you when it is okay to have sex. · Talk to your doctor about birth control. You can get pregnant even before your period returns. Also, you can get pregnant while you are breast-feeding. Mental Health  · Many women get the \"baby blues\" during the first few days after childbirth. You may lose sleep, feel irritable, and cry easily. You may feel happy one minute and sad the next. Hormone changes are one cause of these emotional changes. Also, the demands of a new baby, along with visits from relatives or other family needs, add to a mother's stress. The \"baby blues\" often peak around the fourth day. Then they ease up in less than 2 weeks. · If your moodiness or anxiety lasts for more than 2 weeks, or if you feel like life is not worth living, you may have postpartum depression. This is different for each mother. Some mothers with serious depression may worry intensely about their infant's well-being. Others may feel distant from their child. Some mothers might even feel that they might harm their baby. A mother may have signs of paranoia, wondering if someone is watching her. · With all the changes in your life, you may not know if you are depressed. Pregnancy sometimes causes changes in how you feel that are similar to the symptoms of depression. · Symptoms of depression include:  · Feeling sad or hopeless and losing interest in daily activities. These are the most common symptoms of depression. · Sleeping too much or not enough. · Feeling tired. You may feel as if you have no energy. · Eating too much or too little. · [employer] ([employer]I) offers a Warm line; Chat with the Expert phone sessions; Information and Articles about Pregnancy and Postpartum Mood Disorders; Comprehensive List of Free Support Groups; Knowledgeable local coordinators who will offer support, information, and resources; Guide to Resources on [employer]ient; Calendar of events in the  mood disorders community; Latest News and Research; and [facility] [address] for [employer]. Remember - You are not alone; You are not to blame; With help, you will be well. 0-180-201-PPD(0447). WWW. POSTPARTUM. NET   · Writing or talking about death, such as writing suicide notes or talking about guns, knives, or pills. Keep the numbers for these national suicide hotlines: 0-799-849-TALK (3-236.828.9565) and 4-099-KULZOCS (7-948.194.7655). If you or someone you know talks about suicide or feeling hopeless, get help right away. Constipation and Hemorrhoids  · Drink plenty of fluids, enough so that your urine is light yellow or clear like water. If you have kidney, heart, or liver disease and have to limit fluids, talk with your doctor before you increase the amount of fluids you drink. · Eat plenty of fiber each day.  Have a bran muffin or bran cereal for breakfast, and try eating a piece of fruit for a mid-afternoon snack. · For painful, itchy hemorrhoids, put ice or a cold pack on the area several times a day for 10 minutes at a time. Follow this by putting a warm compress on the area for another 10 to 20 minutes or by sitting in a shallow, warm bath. When should you call for help? Call 911 anytime you think you may need emergency care. For example, call if:  · You are thinking of hurting yourself, your baby, or anyone else. · You passed out (lost consciousness). · You have symptoms of a blood clot in your lung (called a pulmonary embolism). These may include:    · Sudden chest pain. · Trouble breathing. · Coughing up blood. Call your doctor now or seek immediate medical care if:  · You have severe vaginal bleeding. · You are soaking through a pad each hour for 2 or more hours. · Your vaginal bleeding seems to be getting heavier or is still bright red 4 days after delivery. · You are dizzy or lightheaded, or you feel like you may faint. · You are vomiting or cannot keep fluids down. · You have a fever. · You have new or more belly pain. · You pass tissue (not just blood). · Your vaginal discharge smells bad. · Your belly feels tender or full and hard. · Your breasts are continuously painful or red. · You feel sad, anxious, or hopeless for more than a few days. · You have sudden, severe pain in your belly. · You have symptoms of a blood clot in your leg (called a deep vein thrombosis),          such as:  · Pain in your calf, back of the knee, thigh, or groin. · Redness and swelling in your leg or groin. · You have symptoms of preeclampsia, such as:  · Sudden swelling of your face, hands, or feet. · New vision problems (such as dimness or blurring). · A severe headache. · Your blood pressure is higher than it should be or rises suddenly. · You have new nausea or vomiting.     Watch closely for changes in your health, and be sure to contact your doctor if you have any problems. Additional Information:  Not applicable    These are general instructions for a healthy lifestyle:    No smoking/ No tobacco products/ Avoid exposure to second hand smoke    Surgeon General's Warning:  Quitting smoking now greatly reduces serious risk to your health. Obesity, smoking, and sedentary lifestyle greatly increases your risk for illness    A healthy diet, regular physical exercise & weight monitoring are important for maintaining a healthy lifestyle    Recognize signs and symptoms of STROKE:    F-face looks uneven    A-arms unable to move or move unevenly    S-speech slurred or non-existent    T-time-call 911 as soon as signs and symptoms begin - DO NOT go       back to bed or wait to see if you get better - TIME IS BRAIN. I have had the opportunity to make my options or choices for discharge. I have received and understand these instructions. POST DELIVERY DISCHARGE INSTRUCTIONS    Name: Richard Phillips  YOB: 1990  Primary Diagnosis: Active Problems:    Labor and delivery, indication for care (5/13/2019)        General:     Diet/Diet Restrictions:  · Eight 8-ounce glasses of fluid daily (water, juices); avoid excessive caffeine intake. · Meals/snacks as desired which are high in fiber and carbohydrates and low in fat and cholesterol. Physical Activity / Restrictions / Safety:     · Avoid heavy lifting, no more that 8 lbs. For 2-3 weeks;   · Limit use of stairs to 2 times daily for the first week home. · No driving for one week. · Avoid intercourse 4-6 weeks, no douching or tampon use. · Check with obstetrician before starting or resuming an exercise program.      Discharge Instructions/Special Treatment/Home Care Needs:     · Continue prenatal vitamins. · Continue to use squirt bottle with warm water on your episiotomy after each bathroom use until bleeding stops.   · If steri-strips applied to your incision, remove in 7-10 days. Call your doctor for the following:     · Fever over 101 degrees by mouth. · Vaginal bleeding heavier than a normal menstrual period or clots larger than a golf ball. · Red streaks or increased swelling of legs, painful red streaks on your breast.  · Painful urination, constipation and increased pain or swelling or discharge with your incision. · If you feel extremely anxious or overwhelmed. · If you have thoughts of harming yourself and/or your baby. Pain Management:     · Take Acetaminophen (Tylenol) or Ibuprofen (Advil, Motrin), as directed for pain. · Use a warm Sitz bath 3 times daily to relieve episiotomy or hemorrhoidal discomfort. · For hemorrhoidal discomfort, use Tucks and Anusol cream as needed and directed. · Heating pad to  incision as needed. Follow-Up Care:     Appointment with MD:   Follow-up Appointments   Procedures    FOLLOW UP VISIT Appointment in: One Week     Standing Status:   Standing     Number of Occurrences:   1     Order Specific Question:   Appointment in     Answer:    One Week     Telephone number: [phone]    Signed By: Bri Loyola MD                                                                                                   Date: 2019 Time: 8:53 AM

## 2019-05-16 NOTE — LACTATION NOTE
This note was copied from a baby's chart. Mother and baby for discharge today. Awaiting neonatologist Dr. Gwen Caldera to examine baby today. Baby had frenulum clipped yesterday. Mother did try and put baby to breast - she is using nipple shield due to short nipples which baby would not latch onto. Baby fussy at first but did manage to latch onto left breast for 7 minutes. She would detach off breast, fuss a little and then re latch. Several good pulls and tugs noted during feeding. Mother states she did notice that it did not hurt as much when baby feeds as it did prior to getting frenulum clipped. A few  small drops of blood noted on outside of nipple shield. Instructed mother to monitor any bleeding from baby's mouth and to call baby's doctor if it continues or worsens. Baby fell asleep after feeding.  encouraged mother to pump (she also has an electric breast pump to use for home- instructions given on how to use pump and store and prepare expressed breast milk.) Mother got 2 ml of colostrum which was cup fed to baby and taken well. Reviewed breastfeeding basics:  Supply and demand, breastfeed or pump for 20 min. if baby does not breastfeed,    stomach size, early  Feeding cues, skin to skin, positioning and baby led latch-on, assymetrical latch with signs of good, deep latch vs shallow, feeding frequency and duration, and log sheet for tracking infant feedings and output. Breastfeeding Booklet and Warm line information given. Discussed typical  weight loss and the importance of infant weight checks with pediatrician 1-2 post discharge. Infant weight loss is -7.7% baby to see pediatrician tomorrow. Shield use recommended due to short nipples/baby having latch difficulty; use of shield affords deeper more comfortable latching with sustained rhythmic suckling and intermittent swallowing noted. Proper care, application and use of shield discussed; anticipatory guidance shared. Engorgement Care Guidelines:  Reviewed how milk is made and normal phases of milk production. Taught care of engorged breasts - frequent breastfeeding encouraged, cool packs and motrin as tolerated. Anticipatory guidance shared. Care for sore/tender nipples discussed:  ways to improve positioning and latch practiced and discussed, hand express colostrum after feedings and let air dry, light application of lanolin, hydrogel pads, seek comfortable laid back feeding position, start feedings on least sore side first 
 
Discussed eating a healthy diet. Instructed mother to eat a variety of foods in order to get a well balanced diet. She should consume an extra 500 calories per day (more than her non-pregnant requirement.) These extra calories will help provide energy needed for optimal breast milk production. Mother also encouraged to \"drink to thirst\" and it is recommended that she drink fluids such as water, fruit/vegetable juice. Nutritious snacks should be available so that she can eat throughout the day to help satisfy her hunger and maintain a good milk supply. Mother has LC# and breastfeeding support group info. . 
 
Pt will successfully establish breastfeeding by feeding in response to early feeding cues  
or wake every 3h, will obtain deep latch, and will keep log of feedings/output. Taught to BF at hunger cues and or q 2-3 hrs and to offer 10-20 drops of hand expressed colostrum at any non-feeds. Breast Assessment Left Breast: Medium Left Nipple: Everted, Intact, Short Right Breast: Medium Right Nipple: Everted, Intact, Short Breast- Feeding Assessment Attends Breast-Feeding Classes: Yes Breast-Feeding Experience: No 
Breast Trauma/Surgery: No 
Type/Quality: Attempted(Mother states she pumped last elier. since baby was not interested in nursing and got drops. She also gave formula since baby did not breastfeed and baby was fussy.  Baby did latch on well and breast fed at 0715 for 35 min. per mother) Lactation Consultant Visits Breast-Feedings: (Mother able to get baby latched on with nipple shield. Baby  for 7 minutes-would come off breast then relatch. A few small drops of blood seen in shield from babys mouth-Mom then pumped 2 ml - cup fed to baby- taken well. Fannie Kilpatrick ) Mother/Infant Observation Mother Observation: Alignment, Breast comfortable, Holds breast, Lets baby end feeding, Nipple round on release, Recognizes feeding cues Infant Observation: Audible swallows, Frenulum checked, Latches nipple and aereolae, Lips flanged, lower, Lips flanged, upper, Opens mouth, Rhythmic suck LATCH Documentation Latch: Grasps breast, tongue down, lips flanged, rhythmic sucking Audible Swallowing: A few with stimulation Type of Nipple: Everted (after stimulation)(short nipples-shield used) Comfort (Breast/Nipple): Soft/non-tender Hold (Positioning): Full assist, teach one side, mother does other, staff holds LATCH Score: 8

## 2019-05-16 NOTE — DISCHARGE SUMMARY
Obstetrical Discharge Summary     Name: Tali Glover MRN: [id number]  SSN: [id number]    YOB: 1990  Age: 34 y.o. Sex: female      Admit Date: 2019    Discharge Date: 2019     Admitting Physician: Sheryl Medrano MD     Attending Physician:  Liana Castillo MD     Admission Diagnoses: Labor and delivery, indication for care [O75.9]    Discharge Diagnoses:   Information for the patient's :  Justyn Drake [[id number]]   Delivery of a 3.355 kg female infant via Vaginal, Spontaneous on 2019 at 4:07 PM  by . Apgars were 8 and 9. Additional Diagnoses:   Hospital Problems  Never Reviewed          Codes Class Noted POA    Labor and delivery, indication for care ICD-10-CM: O75.9  ICD-9-CM: 659.90  2019 Unknown             Lab Results   Component Value Date/Time    Rubella, External Immune 2018    GrBStrep, External Negative 2019       Hospital Course: Normal hospital course following the delivery. For her mild pre-eclampsia, her blood pressures were normal to low mild range following delivery, and no oral antihypertensive was indicated. Disposition at Discharge: Home or self care    Discharged Condition: Stable    Patient Instructions:   Current Discharge Medication List      START taking these medications    Details   ibuprofen (MOTRIN) 800 mg tablet Take 1 Tab by mouth every eight (8) hours as needed for Pain. Qty: 40 Tab, Refills: 1      oxyCODONE-acetaminophen (PERCOCET) 5-325 mg per tablet Take 1 Tab by mouth every six (6) hours as needed for Pain for up to 3 days. Max Daily Amount: 4 Tabs. Qty: 10 Tab, Refills: 0    Associated Diagnoses: Spontaneous vaginal delivery      ferrous sulfate 325 mg (65 mg iron) tablet Take 1 Tab by mouth daily (with lunch).   Qty: 30 Tab, Refills: 1         CONTINUE these medications which have NOT CHANGED    Details   [id number] tracey-folic-dha (PRENATAL DHA+COMPLETE PRENATAL) -300 mg-mcg-mg cmpk Take  by mouth.      cetirizine (ZYRTEC) 10 mg tablet Take 10 mg by mouth. Reference my discharge instructions. Follow-up Appointments   Procedures    FOLLOW UP VISIT Appointment in: One Week     Standing Status:   Standing     Number of Occurrences:   1     Order Specific Question:   Appointment in     Answer:    One Week        Signed By:  Virginia Hoffman MD     May 16, 2019

## 2019-05-16 NOTE — ROUTINE PROCESS
Bedside and Verbal shift change report given to Muriel Russell RN  (oncoming nurse) by Don Mercedes RN  (offgoing nurse). Report included the following information SBAR, Kardex, Intake/Output, MAR and Recent Results

## 2025-08-24 ENCOUNTER — OFFICE VISIT (OUTPATIENT)
Age: 35
End: 2025-08-24

## 2025-08-24 VITALS
TEMPERATURE: 98.3 F | RESPIRATION RATE: 16 BRPM | HEART RATE: 92 BPM | DIASTOLIC BLOOD PRESSURE: 78 MMHG | SYSTOLIC BLOOD PRESSURE: 127 MMHG | WEIGHT: 170 LBS | OXYGEN SATURATION: 95 %

## 2025-08-24 DIAGNOSIS — J06.9 VIRAL UPPER RESPIRATORY TRACT INFECTION WITH COUGH: Primary | ICD-10-CM

## 2025-08-24 DIAGNOSIS — H66.92 ACUTE OTITIS MEDIA, LEFT: ICD-10-CM

## 2025-08-24 RX ORDER — LEVOCETIRIZINE DIHYDROCHLORIDE 5 MG/1
5 TABLET, FILM COATED ORAL
COMMUNITY
Start: 2024-10-18